# Patient Record
Sex: MALE | Race: WHITE | NOT HISPANIC OR LATINO | Employment: FULL TIME | URBAN - METROPOLITAN AREA
[De-identification: names, ages, dates, MRNs, and addresses within clinical notes are randomized per-mention and may not be internally consistent; named-entity substitution may affect disease eponyms.]

---

## 2017-01-13 ENCOUNTER — OFFICE VISIT (OUTPATIENT)
Dept: PSYCHIATRY | Facility: HOSPITAL | Age: 27
End: 2017-01-13

## 2017-01-13 DIAGNOSIS — Z63.4 BEREAVEMENT: ICD-10-CM

## 2017-01-13 DIAGNOSIS — F33.1 MODERATE EPISODE OF RECURRENT MAJOR DEPRESSIVE DISORDER (HCC): Primary | ICD-10-CM

## 2017-01-13 PROCEDURE — 90834 PSYTX W PT 45 MINUTES: CPT | Performed by: MARRIAGE & FAMILY THERAPIST

## 2017-01-13 SDOH — SOCIAL STABILITY - SOCIAL INSECURITY: DISSAPEARANCE AND DEATH OF FAMILY MEMBER: Z63.4

## 2017-01-13 NOTE — PROGRESS NOTES
10:00am-10:45am    Jennifer reported that he has felt somewhat better since last session. He reported that he still feels high levels of anxiety due to the FMLA process and the financial stress that is having on he and the family. He reported he and his wife have been talking about the changes that he is attempting to make. He reported that they will have good days and he will believe things are going well and then she will begin asking questions and he reported that he understands where her insecurities are coming from however they still take a toll on him. Jennifer reported that when he starts a relationship with soemone he is into it emotionally and then after it the routine of it kicks in and the honeymoon is over he begins to 'get in the wrong jacoby' (his words). He reported that he has never been open emotionally with anyone except his father (who is now ). Dayanna and jennifer discussed the emotional impact it has on him to internalize all of his feelings. Jennifer reported that he ends up blowing up in anger. Jennifer discussed that his goal is to get his GED by the beginning of February therefore to be in a new job by march. He reported that he would like to get on at the [employer] or with [employer]. Or to possibly move out of state. Jennifer denied feeling suicidal thoughts or having a desire to die. He reported that he is not passive suicidal thoughts when he fights with his wife anymore. Jennifer also reported that he has been to two group meetings since last session.